# Patient Record
Sex: MALE | Race: BLACK OR AFRICAN AMERICAN | NOT HISPANIC OR LATINO | Employment: UNEMPLOYED
[De-identification: names, ages, dates, MRNs, and addresses within clinical notes are randomized per-mention and may not be internally consistent; named-entity substitution may affect disease eponyms.]

---
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
## 2023-07-19 NOTE — ED PROVIDER NOTES
EMERGENCY DEPARTMENT ENCOUNTER      NAME: Robin Cheney  AGE: 17 year old male  YOB: 2005  MRN: [id number]  EVALUATION DATE & TIME: 6/21/2022  1:01 AM    PCP: [facility]    ED PROVIDER: Prateek Simeon M.D.      Chief Complaint   Patient presents with     Pruritis     Knee Pain         FINAL IMPRESSION:  Left knee strain  Facial rash      ED COURSE & MEDICAL DECISION MAKING:    Pertinent Labs & Imaging studies reviewed. (See chart for details)  17 year old male presents to the Emergency Department for evaluation of refill itching and left knee pain left knee pain has been ongoing for the last few days.  Describes an achiness.  Looks along the midline of the tibial plateau.  Symptoms started after he started playing soccer.  Patient does have a history of Osgood Gustafson disease.  On exam he has marked tenderness along the tibial spine.  Tibial plateau nontender.  No effusions.  Will obtain x-ray.  Patient also reporting itching to his face.  Onset after wearing a new facemask last night while at work.  No obvious erythema or outbreak presently.  Likely heat related versus early contact type dermatitis.  We will plan on Allegra.  Patient cautioned avoid further exposures to similar mass.. Patient appears non toxic with stable vitals signs. Overall exam is benign.        1:07 AM I met with the patient for the initial interview and physical examination. Discussed plan for treatment and workup in the ED.      At the conclusion of the encounter I discussed the results of all of the tests and the disposition. The questions were answered and return precautions provided. The patient or family acknowledged understanding and was agreeable with the care plan.       PPE: Provider wore gloves, N95 mask, eye protection, surgical cap.     MEDICATIONS GIVEN IN THE EMERGENCY:  Medications - No data to display    NEW PRESCRIPTIONS STARTED AT TODAY'S ER VISIT  Current Discharge Medication List       START taking these medications    Details   fexofenadine (ALLEGRA) 60 MG tablet Take 1 tablet (60 mg) by mouth 2 times daily  Qty: 20 tablet, Refills: 0      ibuprofen (ADVIL/MOTRIN) 200 MG tablet Take 2 tablets (400 mg) by mouth every 8 hours as needed  Qty: 60 tablet, Refills: 0                =================================================================    HPI    Patient information was obtained from: Patient     Use of Intrepreter: N/A      Robin LISA Cheney is a 17 year old male with a pertient medical history of Osgood-Schlatter's disease who presents to the ED for evaluation of knee pain and pruritis. Patient presents with left knee pain with physical activity for the past 1 week. He reports playing soccer today with pain. He starts he has taken Benadryl for knee pain. Additionally, patient presents with facial itching after wearing a face covering at a new job for 8 hours. Denies taking any medication for itching. Patient denies any additional complaints at this time.       REVIEW OF SYSTEMS   Constitutional:  Denies fever, chills  Respiratory:  Denies productive cough or increased work of breathing  Cardiovascular:  Denies chest pain, palpitations  GI:  Denies abdominal pain, nausea, vomiting, or change in bowel or bladder habits   Musculoskeletal:  Endorses left knee pain.   Skin:  Endorses itching.    Neurologic:  Denies focal weakness  All systems negative except as marked.     PAST MEDICAL HISTORY:  Past Medical History:   Diagnosis Date     Nasal turbinate hypertrophy 11/27/2012     Near sighted 3/7/2016     Osgood-Schlatter's disease of both knees 4/1/2016     Vitamin D deficiency 4/5/2016       PAST SURGICAL HISTORY:  Past Surgical History:   Procedure Laterality Date     NO PAST SURGERIES           CURRENT MEDICATIONS:    No current facility-administered medications for this encounter.    Current Outpatient Medications:      cetirizine (ZYRTEC) 10 MG tablet, Take 1 tablet (10 mg) by mouth daily,  Disp: 30 tablet, Rfl: 0     diphenhydrAMINE (BENADRYL) 25 MG capsule, Take 1 capsule (25 mg) by mouth every 6 hours as needed for itching or allergies, Disp: 20 capsule, Rfl: 0     diphenhydrAMINE (BENADRYL) 25 MG capsule, Take 1 capsule (25 mg) by mouth every 6 hours as needed for itching or allergies, Disp: 30 capsule, Rfl: 0     molnupiravir (LAGEVRIO) 200 MG capsule, Take 4 capsules (800 mg) by mouth every 12 hours, Disp: 40 each, Rfl: 0     ondansetron (ZOFRAN) 4 MG tablet, [ONDANSETRON (ZOFRAN) 4 MG TABLET] Take 1 tablet (4 mg total) by mouth every 6 (six) hours., Disp: 12 tablet, Rfl: 0     predniSONE (DELTASONE) 20 MG tablet, Take two tablets (= 40mg) each day for 5 (five) days, Disp: 10 tablet, Rfl: 0     tiZANidine (ZANAFLEX) 4 MG tablet, [TIZANIDINE (ZANAFLEX) 4 MG TABLET] Take 1 tablet (4 mg total) by mouth every 6 (six) hours as needed., Disp: 20 tablet, Rfl: 0    ALLERGIES:  No Known Allergies    FAMILY HISTORY:  History reviewed. No pertinent family history.    SOCIAL HISTORY:   Social History     Socioeconomic History     Marital status: Single   Tobacco Use     Smoking status: Never Smoker     Smokeless tobacco: Never Used   Substance and Sexual Activity     Alcohol use: Never       VITALS:  Patient Vitals for the past 24 hrs:   BP Temp Temp src Pulse Resp SpO2 Weight   06/21/22 0059 116/69 98.4  F (36.9  C) Temporal 64 16 97 % 68.8 kg (151 lb 9.6 oz)        PHYSICAL EXAM    Constitutional:  Awake, alert, in no apparent distress  HENT:  Normocephalic, Atraumatic. Bilateral external ears normal. Oropharynx moist. Nose normal. Neck- Normal range of motion with no guarding, No midline cervical tenderness, Supple, No stridor.   Eyes:  PERRL, EOMI with no signs of entrapment, Conjunctiva normal, No discharge.   Musculoskeletal:   No edema. Good range of motion in all major joints. No major deformities noted. Mild soft tissue over medial left tibial plateau. No effusion.  No joint line tenderness..    Integument:  Warm, Dry, No erythema, No rash.   Neurologic:  Alert & oriented, Normal motor function, Normal sensory function, No focal deficits noted.   Psychiatric:  Affect normal, Judgment normal, Mood normal.       RADIOLOGY:  Reviewed all pertinent imaging. Please see official radiology report.  XR Knee Left 3 Views   Final Result   IMPRESSION: Normal joint spaces and alignment. No fracture or joint effusion.              I, Erin Lawson, am serving as a scribe to document services personally performed by Prateek Simeon MD, based on my observation and the provider's statements to me. I, Prateek Simeon MD attest that Erin Lawson is acting in a scribe capacity, has observed my performance of the services and has documented them in accordance with my direction.    Prateek Simeon M.D.  Emergency Medicine  [facility] EMERGENCY ROOM     Prateek Simeon MD  06/21/22 0154     1 pair

## 2024-02-16 ENCOUNTER — HOSPITAL ENCOUNTER (EMERGENCY)
Facility: CLINIC | Age: 19
Discharge: HOME | End: 2024-02-16
Attending: STUDENT IN AN ORGANIZED HEALTH CARE EDUCATION/TRAINING PROGRAM | Admitting: STUDENT IN AN ORGANIZED HEALTH CARE EDUCATION/TRAINING PROGRAM
Payer: COMMERCIAL

## 2024-02-16 ENCOUNTER — APPOINTMENT (OUTPATIENT)
Dept: GENERAL RADIOLOGY | Facility: CLINIC | Age: 19
End: 2024-02-16
Attending: EMERGENCY MEDICINE
Payer: COMMERCIAL

## 2024-02-16 VITALS
HEART RATE: 80 BPM | DIASTOLIC BLOOD PRESSURE: 74 MMHG | SYSTOLIC BLOOD PRESSURE: 126 MMHG | TEMPERATURE: 98.4 F | OXYGEN SATURATION: 99 % | RESPIRATION RATE: 18 BRPM

## 2024-02-16 DIAGNOSIS — S49.92XA INJURY OF LEFT ACROMIOCLAVICULAR JOINT, INITIAL ENCOUNTER: ICD-10-CM

## 2024-02-16 PROCEDURE — 250N000013 HC RX MED GY IP 250 OP 250 PS 637: Performed by: STUDENT IN AN ORGANIZED HEALTH CARE EDUCATION/TRAINING PROGRAM

## 2024-02-16 PROCEDURE — 99283 EMERGENCY DEPT VISIT LOW MDM: CPT

## 2024-02-16 PROCEDURE — 73030 X-RAY EXAM OF SHOULDER: CPT | Mod: LT

## 2024-02-16 RX ORDER — IBUPROFEN 600 MG/1
600 TABLET, FILM COATED ORAL EVERY 6 HOURS PRN
Qty: 90 TABLET | Refills: 0 | Status: SHIPPED | OUTPATIENT
Start: 2024-02-16 | End: 2024-02-16

## 2024-02-16 RX ORDER — IBUPROFEN 600 MG/1
600 TABLET, FILM COATED ORAL EVERY 6 HOURS PRN
Qty: 90 TABLET | Refills: 0 | Status: SHIPPED | OUTPATIENT
Start: 2024-02-16

## 2024-02-16 RX ORDER — IBUPROFEN 600 MG/1
600 TABLET, FILM COATED ORAL ONCE
Status: COMPLETED | OUTPATIENT
Start: 2024-02-16 | End: 2024-02-16

## 2024-02-16 RX ADMIN — IBUPROFEN 600 MG: 600 TABLET, FILM COATED ORAL at 18:44

## 2024-02-16 NOTE — ED TRIAGE NOTES
Pt arrives with c/o left shoulder dislocation. Pt reports he felt like it dislocated while at the gym today. CMS intact. Pt took tylenol PTA.     Triage Assessment (Adult)       Row Name 02/16/24 0573          Triage Assessment    Airway WDL WDL        Respiratory WDL    Respiratory WDL WDL        Cardiac WDL    Cardiac WDL WDL        Peripheral/Neurovascular WDL    Peripheral Neurovascular WDL WDL        Cognitive/Neuro/Behavioral WDL    Cognitive/Neuro/Behavioral WDL WDL

## 2024-02-17 NOTE — ED PROVIDER NOTES
History     Chief Complaint:  Shoulder Pain       The history is provided by the patient.      Robin Cheney is a 19 year old male who presents to the ED for shoulder pain. The patient reports a couple months ago he got into an accident that injured his left shoulder. He states he went to therapy 2 times with improvement. He adds he stopped going to therapy and began working a job at [employer] with lots of lifting. He reports at work a box fell onto his left shoulder and HR sent him here previously. He adds today he was doing dumbbell exercises with he heard a pop following with left shoulder pain. He adds he then began experiencing limited range of motion and nausea. He reports he took 3 tylenol but denies taking ibuprofen.     Independent Historian:   None - Patient Only    Review of External Notes:   None       Medications:    Cetirizine  Diphenhydramine  Fexofenadine  Ibuprofen  Molnupiravir  Ondansetron  Prednisone  Tizanidine    Past Medical History:    Nasal turbinate hypertrophy  Near sighted  Osgood-Schlatter's disease of both knees  Vitamin D deficiency  Patellar tendinitis of left knee  Pruritic disorder    Physical Exam   Patient Vitals for the past 24 hrs:   BP Temp Temp src Pulse Resp SpO2   02/16/24 1730 128/78 98.4  F (36.9  C) Oral 80 18 97 %        Physical Exam  General: Alert and cooperative with exam. Patient in no apparent distress. Normal mentation.  Head:  Scalp is NC/AT  Eyes:  No scleral icterus, PERRL  ENT:  The external nose and ears are normal.   Neck:  Normal range of motion without rigidity.  CV:  Regular rate and rhythm    No pathologic murmur   Resp:  Breath sounds are clear bilaterally    Non-labored, no retractions or accessory muscle use  GI:  Abdomen is soft, no distension, no tenderness. No peritoneal signs  MS:  Left AC joint tenderness to palpation. Limited range of motion to left shoulder due to pain. No obvious deformity. No humerus pain to palpation. Full ROM of left elbow  and wrist.   Skin:  Warm and dry, No rash or lesions noted.  Neuro:  Oriented x 3. No gross motor deficits.      Emergency Department Course   Imaging:  XR Shoulder Left G/E 3 Views   Final Result   IMPRESSION: Normal glenohumeral alignment. No displaced fracture is identified. There is some cortical irregularity to the distal clavicle with slight superior displacement of the distal clavicle at the acromioclavicular joint. Findings are suggestive of    acromioclavicular joint injury. The cortical irregularity along the distal clavicle is new compared to the 09/27/2023 study and may reflect underlying distal clavicular osteolysis.         Emergency Department Course & Assessments:           Interventions:  Medications   ibuprofen (ADVIL/MOTRIN) tablet 600 mg (600 mg Oral $Given 2/16/24 1844)        Independent Interpretation (X-rays, CTs, rhythm strip):  I independently reviewed X-Ray which shows no evidence of fracture or dislocation    Assessments/Consultations/Discussion of Management or Tests:  ED Course as of 02/16/24 1847 Fri Feb 16, 2024 1840 I obtained history and examined the patient as noted above.        Social Determinants of Health affecting care:   None    Disposition:  The patient was discharged.     Impression & Plan    Medical Decision Making:  oRbin Cheney is a 19 year old male who presents with left shoulder injury after lifting weights. On exam, there is no obvious deformity present. He is able to move left elbow and wrist without pain or difficulty. Left shoulder pain, most notable with palpation to left AC joint. Xray completed and does not show evidence of fracture or dislocation, however does show evidence of AC joint injury. Patient was placed in a sling and provided ibuprofen here. Recommend he wear sling for next two weeks and follow up with PT. Referral for PT provided. Encouraged continued use tylenol and ibuprofen along with ice. Discussed reasons to return to ER. Patient is safe  for discharge home.      Diagnosis:    ICD-10-CM    1. Injury of left acromioclavicular joint, initial encounter  S49.92XA ARM SLING L     Physical Therapy Referral           Discharge Medications:  New Prescriptions    IBUPROFEN (ADVIL/MOTRIN) 600 MG TABLET    Take 1 tablet (600 mg) by mouth every 6 hours as needed for mild pain, moderate pain or inflammatory pain          Scribe Disclosure:  DELIA, Shalini Piedra, am serving as a scribe at 6:39 PM on 2/16/2024 to document services personally performed by Kalee Christian PA-C based on my observations and the provider's statements to me.     2/16/2024   Kalee Covarrubias PA-C, PA-C  02/16/24 1901

## 2024-02-17 NOTE — DISCHARGE INSTRUCTIONS
Please take 600 mg ibuprofen every 6 hours for ongoing pain. Wear sling for next two weeks. PT referral provided, they will call to coordinate scheduling appointment.

## 2024-03-07 NOTE — ED PROVIDER NOTES
Emergency Department Encounter     Evaluation Date & Time:   2021 12:54 PM    CHIEF COMPLAINT:  Allergic Reaction      Triage Note:Pt presents to the ED with c/o worsening hives and difficulty swallowing that started around 1230 today. Pt unsure of what he is allergic to but noticed hives on his arms, feet, abdomen, and back. Denies difficulty breathing.         Impression and Plan     ED COURSE & MEDICAL DECISION MAKIN:05 PM I met with the patient to gather history and to perform my initial exam. We discussed plans for the ED course, including diagnostic testing and treatment. PPE worn: n95 mask, goggles.   1:56 PM I rechecked the patient. The hives are gone and he is ready for discharge.     Patient is here for evaluation of hives, no inciting factors.  No evidence of oral swelling, no wheezing, vomiting or diarrhea.  Following treatment with steroids, Benadryl, Pepcid patient have resolution of hives, states that he feels at his baseline.  Will discharge.  Recommend follow-up this primary.    At the conclusion of the encounter I discussed the results of all the tests and the disposition. The questions were answered. The patient or family acknowledged understanding and was agreeable with the care plan.    FINAL IMPRESSION:    ICD-10-CM    1. Urticaria  L50.9          Reassessments & Consults       MEDICATIONS GIVEN IN THE EMERGENCY DEPARTMENT:  Medications   0.9% sodium chloride BOLUS (1,000 mLs Intravenous New Bag 21 1327)     Followed by   sodium chloride 0.9% infusion (has no administration in time range)   methylPREDNISolone sodium succinate (solu-MEDROL) injection 125 mg (125 mg Intravenous Given 21 1330)   diphenhydrAMINE (BENADRYL) injection 50 mg (50 mg Intravenous Given 21 1328)   famotidine (PEPCID) injection 40 mg (40 mg Intravenous Given 21 1333)       NEW PRESCRIPTIONS STARTED AT TODAY'S ED VISIT:  New Prescriptions    No medications on file       HPI     HPI      Robin Cheney is a 16 year old male with a pertinent history of prior allergic reactions who presents to this ED via walk-in for evaluation of anaphylaxis.    Patient reports shortly after waking this morning (7/17) he broke out is full body hives which started on his palmar and plantar surfaces and progressed up his extremities. He states the rash is very itchy and he experienced palpitations during the initial wave of itchiness. Patient reports swallowing feels strange although he denies trouble breathing and shortness of breath. No new soaps, detergents, foods, or other new exposures. Patient states he was recently seen by an allergist who did labs and told him he is allergic to grass, dust, dust mites, cats, and dogs, although patient states he has frequent exposures to these allergens and has not had prior reactions. No other complaints or concerns expressed at this time.    REVIEW OF SYSTEMS:  Review of Systems   HENT: Positive for trouble swallowing.    Respiratory: Negative for cough, chest tightness, shortness of breath, wheezing and stridor.         Negative for trouble breathing.   Cardiovascular: Positive for palpitations. Negative for chest pain.   Gastrointestinal: Negative for abdominal pain, nausea and vomiting.   Genitourinary: Negative for dysuria, frequency and hematuria.   Musculoskeletal: Negative for myalgias.   Skin: Positive for rash (diffuse hives).        Positive for pruritus.   Neurological: Negative for dizziness, light-headedness and headaches.   All other systems reviewed and are negative.          Medical History     Past Medical History:   Diagnosis Date     Nasal turbinate hypertrophy 11/27/2012     Near sighted 3/7/2016     Osgood-Schlatter's disease of both knees 4/1/2016     Vitamin D deficiency 4/5/2016       Past Surgical History:   Procedure Laterality Date     NO PAST SURGERIES         No family history on file.    Social History     Tobacco Use     Smoking status:  Never Smoker     Smokeless tobacco: Never Used   Substance Use Topics     Alcohol use: Never     Drug use: Not on file       ondansetron (ZOFRAN) 4 MG tablet  tiZANidine (ZANAFLEX) 4 MG tablet        Physical Exam     First Vitals:  Patient Vitals for the past 24 hrs:   BP Temp Temp src Pulse Resp SpO2 Weight   07/17/21 1345 121/69 -- -- 78 -- 100 % --   07/17/21 1330 116/56 -- -- 85 -- 98 % --   07/17/21 1325 121/57 -- -- 64 -- 99 % --   07/17/21 1252 135/60 98.2  F (36.8  C) Oral 75 18 97 % 68 kg (150 lb)       PHYSICAL EXAM:   Physical Exam  Vitals and nursing note reviewed.   Constitutional:       General: He is not in acute distress.     Appearance: Normal appearance. He is not ill-appearing.   HENT:      Head: Normocephalic and atraumatic.      Comments: No swelling to lips, tongue, or posterior oropharynx.     Right Ear: External ear normal.      Left Ear: External ear normal.      Nose: Nose normal.      Mouth/Throat:      Mouth: Mucous membranes are moist.   Eyes:      Extraocular Movements: Extraocular movements intact.      Pupils: Pupils are equal, round, and reactive to light.   Cardiovascular:      Rate and Rhythm: Normal rate and regular rhythm.   Pulmonary:      Effort: Pulmonary effort is normal.      Breath sounds: Normal breath sounds. No stridor.   Abdominal:      General: Abdomen is flat. There is no distension.      Palpations: There is no mass.      Tenderness: There is no abdominal tenderness. There is no guarding or rebound.      Hernia: No hernia is present.   Musculoskeletal:         General: No swelling, tenderness or signs of injury. Normal range of motion.      Cervical back: Normal range of motion.   Skin:     General: Skin is warm.      Capillary Refill: Capillary refill takes less than 2 seconds.      Comments: Diffuse hives throughout body   Neurological:      General: No focal deficit present.      Mental Status: He is alert and oriented to person, place, and time. Mental status is  at baseline.      Cranial Nerves: No cranial nerve deficit.      Motor: No weakness.      Coordination: Coordination normal.   Psychiatric:         Mood and Affect: Mood normal.           Results     LAB:  All pertinent labs reviewed and interpreted  Labs Ordered and Resulted from Time of ED Arrival Up to the Time of Departure from the ED - No data to display    RADIOLOGY:  No orders to display              ECG:  None    PROCEDURES:  Procedures:  None    [facility] Documentation        CMS Diagnoses:           Cecilio Meade DO  Staff Physician  Emergency Medicine  [facility] EMERGENCY ROOM       Cecilio Meade DO  07/17/21 3955     No

## 2024-03-12 ENCOUNTER — HOSPITAL ENCOUNTER (EMERGENCY)
Facility: CLINIC | Age: 19
Discharge: HOME | End: 2024-03-12
Attending: EMERGENCY MEDICINE | Admitting: EMERGENCY MEDICINE
Payer: COMMERCIAL

## 2024-03-12 VITALS
DIASTOLIC BLOOD PRESSURE: 65 MMHG | HEART RATE: 61 BPM | OXYGEN SATURATION: 98 % | TEMPERATURE: 98.5 F | HEIGHT: 73 IN | BODY MASS INDEX: 19.88 KG/M2 | SYSTOLIC BLOOD PRESSURE: 111 MMHG | RESPIRATION RATE: 20 BRPM | WEIGHT: 150 LBS

## 2024-03-12 VITALS
TEMPERATURE: 99.5 F | SYSTOLIC BLOOD PRESSURE: 111 MMHG | OXYGEN SATURATION: 100 % | BODY MASS INDEX: 21.12 KG/M2 | RESPIRATION RATE: 15 BRPM | HEART RATE: 65 BPM | DIASTOLIC BLOOD PRESSURE: 64 MMHG | WEIGHT: 160.05 LBS

## 2024-03-12 DIAGNOSIS — T78.2XXA ANAPHYLAXIS, INITIAL ENCOUNTER: ICD-10-CM

## 2024-03-12 DIAGNOSIS — L50.9 URTICARIA: ICD-10-CM

## 2024-03-12 LAB
HOLD SPECIMEN: NORMAL

## 2024-03-12 PROCEDURE — 99285 EMERGENCY DEPT VISIT HI MDM: CPT | Mod: 25

## 2024-03-12 PROCEDURE — 96361 HYDRATE IV INFUSION ADD-ON: CPT

## 2024-03-12 PROCEDURE — 96375 TX/PRO/DX INJ NEW DRUG ADDON: CPT

## 2024-03-12 PROCEDURE — 250N000013 HC RX MED GY IP 250 OP 250 PS 637: Performed by: EMERGENCY MEDICINE

## 2024-03-12 PROCEDURE — 258N000003 HC RX IP 258 OP 636: Performed by: EMERGENCY MEDICINE

## 2024-03-12 PROCEDURE — 96374 THER/PROPH/DIAG INJ IV PUSH: CPT

## 2024-03-12 PROCEDURE — 250N000011 HC RX IP 250 OP 636

## 2024-03-12 PROCEDURE — 250N000009 HC RX 250: Performed by: EMERGENCY MEDICINE

## 2024-03-12 PROCEDURE — 250N000009 HC RX 250

## 2024-03-12 PROCEDURE — 250N000011 HC RX IP 250 OP 636: Performed by: EMERGENCY MEDICINE

## 2024-03-12 PROCEDURE — 258N000003 HC RX IP 258 OP 636

## 2024-03-12 RX ORDER — DIPHENHYDRAMINE HCL 25 MG
50 CAPSULE ORAL ONCE
Status: DISCONTINUED | OUTPATIENT
Start: 2024-03-12 | End: 2024-03-12

## 2024-03-12 RX ORDER — CETIRIZINE HYDROCHLORIDE 10 MG/1
10 TABLET ORAL ONCE
Status: COMPLETED | OUTPATIENT
Start: 2024-03-12 | End: 2024-03-12

## 2024-03-12 RX ORDER — DIPHENHYDRAMINE HYDROCHLORIDE 50 MG/ML
25 INJECTION INTRAMUSCULAR; INTRAVENOUS ONCE
Status: COMPLETED | OUTPATIENT
Start: 2024-03-12 | End: 2024-03-12

## 2024-03-12 RX ORDER — CETIRIZINE HYDROCHLORIDE 10 MG/1
10 TABLET ORAL DAILY
Qty: 5 TABLET | Refills: 0 | Status: SHIPPED | OUTPATIENT
Start: 2024-03-12 | End: 2024-03-17

## 2024-03-12 RX ORDER — PREDNISONE 20 MG/1
TABLET ORAL
Qty: 10 TABLET | Refills: 0 | Status: SHIPPED | OUTPATIENT
Start: 2024-03-13 | End: 2024-07-11

## 2024-03-12 RX ORDER — METHYLPREDNISOLONE SODIUM SUCCINATE 125 MG/2ML
125 INJECTION, POWDER, LYOPHILIZED, FOR SOLUTION INTRAMUSCULAR; INTRAVENOUS ONCE
Status: COMPLETED | OUTPATIENT
Start: 2024-03-12 | End: 2024-03-12

## 2024-03-12 RX ORDER — DIPHENHYDRAMINE HYDROCHLORIDE 50 MG/ML
50 INJECTION INTRAMUSCULAR; INTRAVENOUS ONCE
Status: COMPLETED | OUTPATIENT
Start: 2024-03-12 | End: 2024-03-12

## 2024-03-12 RX ORDER — LIDOCAINE 40 MG/G
CREAM TOPICAL
Status: DISCONTINUED | OUTPATIENT
Start: 2024-03-12 | End: 2024-03-13 | Stop reason: HOSPADM

## 2024-03-12 RX ADMIN — SODIUM CHLORIDE 1000 ML: 9 INJECTION, SOLUTION INTRAVENOUS at 12:11

## 2024-03-12 RX ADMIN — SODIUM CHLORIDE 1000 ML: 9 INJECTION, SOLUTION INTRAVENOUS at 21:01

## 2024-03-12 RX ADMIN — DIPHENHYDRAMINE HYDROCHLORIDE 25 MG: 50 INJECTION, SOLUTION INTRAMUSCULAR; INTRAVENOUS at 21:09

## 2024-03-12 RX ADMIN — EPINEPHRINE 0.5 MG: 1 INJECTION INTRAMUSCULAR; INTRAVENOUS; SUBCUTANEOUS at 20:51

## 2024-03-12 RX ADMIN — FAMOTIDINE 20 MG: 10 INJECTION, SOLUTION INTRAVENOUS at 21:00

## 2024-03-12 RX ADMIN — EPINEPHRINE 0.3 MG: 1 INJECTION INTRAMUSCULAR; INTRAVENOUS; SUBCUTANEOUS at 12:04

## 2024-03-12 RX ADMIN — DIPHENHYDRAMINE HYDROCHLORIDE 50 MG: 50 INJECTION INTRAMUSCULAR; INTRAVENOUS at 12:13

## 2024-03-12 RX ADMIN — METHYLPREDNISOLONE SODIUM SUCCINATE 125 MG: 125 INJECTION, POWDER, FOR SOLUTION INTRAMUSCULAR; INTRAVENOUS at 12:06

## 2024-03-12 RX ADMIN — CETIRIZINE HYDROCHLORIDE 10 MG: 10 TABLET, FILM COATED ORAL at 21:09

## 2024-03-12 RX ADMIN — FAMOTIDINE 20 MG: 10 INJECTION, SOLUTION INTRAVENOUS at 12:05

## 2024-03-12 ASSESSMENT — COLUMBIA-SUICIDE SEVERITY RATING SCALE - C-SSRS
2. HAVE YOU ACTUALLY HAD ANY THOUGHTS OF KILLING YOURSELF IN THE PAST MONTH?: NO
6. HAVE YOU EVER DONE ANYTHING, STARTED TO DO ANYTHING, OR PREPARED TO DO ANYTHING TO END YOUR LIFE?: NO
1. IN THE PAST MONTH, HAVE YOU WISHED YOU WERE DEAD OR WISHED YOU COULD GO TO SLEEP AND NOT WAKE UP?: NO
6. HAVE YOU EVER DONE ANYTHING, STARTED TO DO ANYTHING, OR PREPARED TO DO ANYTHING TO END YOUR LIFE?: NO
1. IN THE PAST MONTH, HAVE YOU WISHED YOU WERE DEAD OR WISHED YOU COULD GO TO SLEEP AND NOT WAKE UP?: NO
2. HAVE YOU ACTUALLY HAD ANY THOUGHTS OF KILLING YOURSELF IN THE PAST MONTH?: NO

## 2024-03-12 ASSESSMENT — ACTIVITIES OF DAILY LIVING (ADL)
ADLS_ACUITY_SCORE: 35
ADLS_ACUITY_SCORE: 33
ADLS_ACUITY_SCORE: 35

## 2024-03-12 NOTE — DISCHARGE INSTRUCTIONS
You can use Benadryl for itching.  He can also use Pepcid for itching.  Discharge Instructions  Allergic Reaction    An allergic reaction can result in a rash, itching, swelling, watery eyes, or a runny nose. A serious reaction can cause swelling of your mouth or throat, or difficulty breathing (wheezing). The most serious allergy is called anaphylaxis, and can be life-threatening. Many allergies result in hives, also called urticaria.       An allergy happens when the body s natural defense system (immune system) overreacts to something. The thing that triggers your allergic reaction is called an allergen. The first time you are exposed to your allergen, you may not have any reaction, but the body makes a protein called an antibody. The antibody lets the body recognize and remember the allergen.  Every time you are exposed to your allergen you get more antibody and your reaction can be more severe.      Generally, every Emergency Department visit should have a follow-up clinic visit with either a primary or a specialty clinic/provider. Please follow-up as instructed by your emergency provider today.    Call 911 if you have:  Swelling of the lips, tongue or throat.  Hoarse voice, drooling or trouble breathing.  Chest pain or shortness of breath.  Fainting or unconsciousness.    What can I do to help myself?  If you know what caused your allergy, do not touch it, throw any of it away, and tell others not to have it around you. Wear a medical alert bracelet with a name of your allergen on it.  If you do not know what you are allergic to, keep a journal of everything that you are exposed to (foods, soaps, medicines, etc.). Take this with you when you follow up with your primary provider or specialist (Allergist). This may help determine what is causing the allergic reaction.  Take any medicines that are prescribed.  Antihistamines can decrease rash or itching. You may use Benadryl  (diphenhydramine) for rash or itching  according to package directions, or use a prescription antihistamine as recommended by your provider.  For significant allergic reactions, you may have been given a prescription for an epinephrine (adrenaline) auto injector. Carry this with you at all times! Use it if you are having any symptoms of anaphylaxis.  Do not be afraid to use it. Return to the Emergency Department if you use your auto injector, call 911 if it does not resolve the symptoms. It is only meant to buy time until you can get to the Emergency Department!  If you were given a prescription for medicine here today, be sure to read all of the information (including the package insert) that comes with your prescription.  This will include important information about the medicine, its side effects, and any warnings that you need to know about.  The pharmacist who fills the prescription can provide more information and answer questions you may have about the medicine.  If you have questions or concerns that the pharmacist cannot address, please call or return to the Emergency Department.   Remember that you can always come back to the Emergency Department if you are not able to see your regular provider in the amount of time listed above, if you get any new symptoms, or if there is anything that worries you.

## 2024-03-12 NOTE — ED PROVIDER NOTES
"  History     Chief Complaint:  Allergic Reaction       HPI   Robin Cheney is a 19 year old male with history of multiple environmental allergies who presents with complaint of difficulty swallowing and hives.  Patient states he started to notice hives last night around 10 PM.  Hives are worsening today, have spread over his torso and all 4 extremities, very pruritic.  States he has not taken any medication for this as he does not want to break his fast for Ramadan.  He does have an EpiPen however he has not used that.  He has tightness in his throat and difficulty swallowing for the last hour.  He denies headache, dizziness, chest pain, shortness of breath, abdominal pain, nausea and vomiting.  He has never been intubated however he has had to use his EpiPen at least 5 or 6 times in the past.      Independent Historian:   None - Patient Only    Review of External Notes:   1/26/2022 ED visit for allergic reaction      Medications:    cetirizine (ZYRTEC) 10 MG tablet  diphenhydrAMINE (BENADRYL) 25 MG capsule  diphenhydrAMINE (BENADRYL) 25 MG capsule  fexofenadine (ALLEGRA) 60 MG tablet  ibuprofen (ADVIL/MOTRIN) 200 MG tablet  ibuprofen (ADVIL/MOTRIN) 600 MG tablet  molnupiravir (LAGEVRIO) 200 MG capsule  ondansetron (ZOFRAN) 4 MG tablet  predniSONE (DELTASONE) 20 MG tablet  tiZANidine (ZANAFLEX) 4 MG tablet        Past Medical History:    Past Medical History:   Diagnosis Date    Nasal turbinate hypertrophy 11/27/2012    Near sighted 3/7/2016    Osgood-Schlatter's disease of both knees 4/1/2016    Vitamin D deficiency 4/5/2016       Past Surgical History:    Past Surgical History:   Procedure Laterality Date    NO PAST SURGERIES          Physical Exam   Patient Vitals for the past 24 hrs:   BP Temp Temp src Pulse Resp SpO2 Height Weight   03/12/24 1149 (!) 124/102 98.5  F (36.9  C) Oral 103 20 100 % 1.854 m (6' 1\") 68 kg (150 lb)        Physical Exam  Physical Exam:  GENERAL: Warm, dry, alert, no increased work of " breathing, appears mildly uncomfortable while sitting in wheelchair.  HEENT: No scleral icterus, PERRL, clear conjunctiva without injection.  No erythema nor edema noted on lips, tongue, and posterior oropharynx.  NECK: supple without lymphadenopathy.  No stiffness or restricted range of motion  HEART: Regular rate and rhythm, no murmur or rubs  LUNGS: CTAB, moving air well.  No crackles or wheezes are heard.  Speaking in full sentences, no respiratory distress.  ABD: Soft, nontender, nondistended, no guarding, with good bowel sounds heard.  BACK: no obvious deformities  EXTREMITIES: Moves all extremities without difficulty, no calf tenderness or peripheral edema.  SKIN: Wheals of erythematous to pink swellings of varying shapes and sizes with central pallor across the chest, abdomen, all 4 extremities..  NEUROLOGICAL: No focal deficit, alert and oriented x 4.    PSYCH: Appropriate mood and affect.     Emergency Department Course   ECG      Imaging:  No orders to display          Laboratory:  Labs Ordered and Resulted from Time of ED Arrival to Time of ED Departure - No data to display     Procedures   None      Emergency Department Course & Assessments:    Interventions:  Medications   sodium chloride (PF) 0.9% PF flush 3 mL (has no administration in time range)   sodium chloride (PF) 0.9% PF flush 3 mL (has no administration in time range)   diphenhydrAMINE (BENADRYL) injection 50 mg (has no administration in time range)   sodium chloride 0.9% BOLUS 1,000 mL (has no administration in time range)   famotidine (PEPCID) injection 20 mg (20 mg Intravenous $Given 3/12/24 1205)   methylPREDNISolone sodium succinate (solu-MEDROL) injection 125 mg (125 mg Intravenous $Given 3/12/24 1206)   EPINEPHrine (ADRENALIN) kit 0.3-0.5 mg (0.3 mg Intramuscular $Given 3/12/24 1204)        Assessments:  See MDM    Independent Interpretation (X-rays, CTs, rhythm strip):  None    Consultations/Discussion of Management or Tests:  Staffed  with Dr. Ochoa       Social Determinants of Health affecting care:   None    Disposition:  The patient was discharged.     Impression & Plan    CMS Diagnoses: None          Medical Decision Making:  Patient is a 19-year-old male with history of environmental allergies who presents with urticarial rash across his torso and extremities and difficulty swallowing.  Differential includes but is not limited to anaphylaxis, urticaria, viral rash among many others.  Vital signs are unremarkable presentation, no hypoxia.  Given prior history of anaphylaxis requiring epinephrine and clinical presentation with hives and difficulty swallowing elected for immediate IM epinephrine.  Patient was also provided further allergy medications including steroid and Benadryl.  Within 20 minutes patient reports he no longer has difficulty swallowing, no tightness in throat, and he no longer has pruritus.  Hives across his body have dissipated.  We watched the patient for 2 hours after giving him his epinephrine, there was no return of symptoms, no further complaints.  Patient still has his EpiPen available, he was told not to hesitate to use this next time.  We discussed other symptoms for which she should return to the emergency department.  Patient states he understands and agrees with these plans.  The patient was discharged.      Diagnosis:  No diagnosis found.     Discharge Medications:  New Prescriptions    No medications on file          Cecilio Vásquez PA-C  3/12/2024   Jessica Ochoa MD Speakman, John, PA-C  03/12/24 [id number]       Cecilio Vásquez PA-C  03/12/24 [id number]

## 2024-03-12 NOTE — ED TRIAGE NOTES
Pt presents for evaluation of an allergic reaction. Pt started with hives last night around 2200 after getting home from prayer. Started out with a few hives on the arms and forehead. Pt then went to bed, woke up and hives were gone. Then today, around 1040, hives came back and were generalized. Pt also c/o difficulty breathing and difficulty swallowing. Has an Epi-pen for allergies related to dust mites, grass, cockroaches, cats and dogs has not used any meds PTA.

## 2024-03-12 NOTE — ED PROVIDER NOTES
ED ATTENDING PHYSICIAN NOTE:   I evaluated this patient in conjunction with Cecilio Vásquez PA-C  I have participated in the care of the patient and personally performed key elements of the history, exam, and medical decision making.      HPI:   Robin Cheney is a 19 year old male with a history of anaphylaxis to environmental allergies presents to the emergency department with a complaint of hives, itching, throat swelling.  Patient states that he does have an EpiPen but has not used it.  His symptoms started last night and worsened this morning.  He has not had any vomiting or diarrhea.    Independent Historian:   None - Patient Only    Review of External Notes:       EXAM:   General: Well-nourished, resting comfortably when I enter the room  Eyes: Pupils equal, conjunctivae pink no scleral icterus or conjunctival injection  ENT:  Moist mucus membranes.  Uvula is midline, no swelling of the oropharynx.  Respiratory:  Lungs clear to auscultation bilaterally, no crackles/rubs/wheezes.  Good air movement  CV: Normal rate and rhythm, no murmurs  GI:  Abdomen soft and non-distended.  No tenderness, guarding or rebound  Skin: Warm, dry.  Urticarial rash diffusely.  Musculoskeletal: No peripheral edema or calf tenderness  Neuro: Alert and oriented to person/place/time  Psychiatric: Normal affect      Independent Interpretation (X-rays, CTs, rhythm strip):  None    Consultations/Discussion of Management or Tests:  None     Social Determinants of Health affecting care:   None     MEDICAL DECISION MAKING/ASSESSMENT AND PLAN:   19-year-old male with a history of anaphylaxis presents to the emergency department with a complaint of allergic reaction.  Patient reports that his symptoms started yesterday with a rash and itching.  He states that he has a lot of environmental allergies but has not been to the allergist.  Today he noticed some itching and possible swelling of his throat.  On exam patient is well-appearing, he is  speaking in full sentences, he is protecting his airway.  No swelling of the tongue or oropharynx.  He does have a urticarial rash diffusely all over his body.  It is very itchy.  He has not had any vomiting or diarrhea.  Patient's vital signs have been stable here in the ED, he is not hypoxic.  Patient feels much better after the epinephrine.  He is also given Solu-Medrol, Benadryl, and Pepcid.  Patient does have an EpiPen, he just did not use it today.  Does not need a refill prescription.  Patient is observed here in the emergency department, and does not have any return of symptoms.  He feels much better.  Patient is discharged home with return precautions.     DIAGNOSIS:     ICD-10-CM    1. Anaphylaxis, initial encounter  T78.2XXA                DISPOSITION:   Patient is discharged home.     Jessica Ochoa MD  3/12/2024  [facility] EMERGENCY DEPT      Jessica Ochoa MD  03/12/24 1558

## 2024-03-13 NOTE — DISCHARGE INSTRUCTIONS
What do you do next:   Continue your home medications unless we have specifically changed them  If medications were prescribed today, take these as directed  Follow up as indicated below    When do you return: Review your discharge papers for specifics on reasons to return.    Thank you for allowing us to care for you today.

## 2024-03-13 NOTE — ED PROVIDER NOTES
History     Chief Complaint:  Allergic Reaction       HPI   Robin Cheney is a 19 year old male who presents for evaluation of allergic reaction. During evaluation, the patient had hives on his chest, abdomen, and arms with swollen lips which were noticeable to the patient. He reports not feeling shortness of breath. The patient was seen in the ER earlier today for an allergic reaction where an epi pen was administered. He was discharged home after hives and oral swelling resolved. At home hives and swelling returned along with shortness of breath. Denies exposure to new soaps or laundry detergents.        Independent Historian:    None - Patient Only    Review of External Notes:  ED provider note from earlier today: The patient received Benadryl, Pepcid, Solu-Medrol, and intramuscular epinephrine.  Per the medical decision making, the patient still has his epinephrine pen available.    Allergies:  Cats  Cockroach  Dogs  Dust Mites  Grass     Physical Exam   Patient Vitals for the past 24 hrs:   BP Temp Pulse Resp SpO2 Weight   03/12/24 2200 118/65 -- 90 15 98 % --   03/12/24 2145 120/67 -- 94 14 99 % --   03/12/24 2130 116/66 -- 99 14 100 % --   03/12/24 2115 120/61 -- 101 12 97 % --   03/12/24 2100 130/74 -- 110 10 98 % --   03/12/24 2049 135/72 -- 104 15 100 % --   03/12/24 2042 119/89 -- -- -- -- --   03/12/24 2040 -- 99.5  F (37.5  C) (!) 139 20 100 % 72.6 kg (160 lb 0.9 oz)        Physical Exam  Constitutional: Vital signs reviewed as above.   Eyes: PEERL, EOMI B/L  Oropharynx: MMM.  There appears to be swelling/hives on the upper and lower lips.  There do not appear to be any intraoral lesions, tongue swelling, or swelling on the floor of the mouth.  Uvula is midline and is not edematous.  Neck:  No JVD noted. FROM.  No stridor noted.  Cardiovascular: tachycardic rate, Regular rhythm and normal heart sounds.  No murmur heard. Equal B/L peripheral pulses.  Pulmonary/Chest: Effort normal and breath sounds  normal. No respiratory distress. Patient has no wheezes. Patient has no rales.   Gastrointestinal: Soft. There is no tenderness.   Musculoskeletal/Extremities: No pitting edema noted. Normal tone.  Neurological: Alert  Skin: Skin is warm and dry. There is no diaphoresis noted.   Psychiatric: The patient appears calm.     Emergency Department Course       Emergency Department Course & Assessments:    Interventions:  Medications   lidocaine 1 % 0.1-1 mL (has no administration in time range)   lidocaine (LMX4) cream (has no administration in time range)   sodium chloride (PF) 0.9% PF flush 3 mL (3 mLs Intracatheter Not Given 3/12/24 2101)   sodium chloride (PF) 0.9% PF flush 3 mL (has no administration in time range)   famotidine (PEPCID) injection 20 mg (20 mg Intravenous $Given 3/12/24 2100)   EPINEPHrine (ADRENALIN) kit 0.5 mg (0.5 mg Intramuscular $Given 3/12/24 2051)   sodium chloride 0.9% BOLUS 1,000 mL (0 mLs Intravenous Stopped 3/12/24 2209)   cetirizine (zyrTEC) tablet 10 mg (10 mg Oral $Given 3/12/24 2109)   diphenhydrAMINE (BENADRYL) injection 25 mg (25 mg Intravenous $Given 3/12/24 2109)        Assessments, Independent Interpretation, Consult/Discussion of ManagementTests:  ED Course as of 03/12/24 2210   Tue Mar 12, 2024   2045 Initial evaluation    2201 Rechecked and updated.       Social Determinants of Health affecting care:  None    Disposition:  See ED Course and [facility]    Impression & Plan    CMS Diagnoses: None    Code Status: No Order    MIPS (If applicable):  N/A    Medical Decision Making:  This 19-year-old male patient presents to the ED due to urticaria.  Please see the HPI and exam for specifics.  The patient did not have shortness of breath or throat tingling/tightness.  I retreated him with epinephrine as well as Benadryl.  He received Zyrtec as well.  On reassessment, the patient has almost complete resolution of symptoms.  He wishes to go home.  I will encourage close outpatient follow-up,  prescribed him Zyrtec as well as steroids and encouraged return with new or worsening symptoms.      Critical Care:  None.    Diagnosis:    ICD-10-CM    1. Urticaria  L50.9            Discharge Medications:  New Prescriptions    CETIRIZINE (ZYRTEC) 10 MG TABLET    Take 1 tablet (10 mg) by mouth daily for 5 days    PREDNISONE (DELTASONE) 20 MG TABLET    Take two tablets (= 40mg) each day for 5 (five) days        Scribe Disclosure:  Cathy LIN, am serving as a scribe at 8:44 PM on 3/12/2024 to document services personally performed by Keith Blackwood DO based on my observations and the provider's statements to me.    3/12/2024   Keith Blackwood DO Burns, Bradley Joseph, DO  03/12/24 4533

## 2024-03-13 NOTE — ED TRIAGE NOTES
Patient seen earlier today in ER for allergic reaction. Gave epi pen earlier. Discharged home, swelling completely resolved. At home, swelling return. Hives and oral swelling present. Patient reports difficulty breathing due to swelling. Patient denies any new exposures.

## 2024-07-11 ENCOUNTER — HOSPITAL ENCOUNTER (EMERGENCY)
Facility: CLINIC | Age: 19
Discharge: HOME | End: 2024-07-11
Attending: STUDENT IN AN ORGANIZED HEALTH CARE EDUCATION/TRAINING PROGRAM | Admitting: STUDENT IN AN ORGANIZED HEALTH CARE EDUCATION/TRAINING PROGRAM
Payer: COMMERCIAL

## 2024-07-11 VITALS
HEIGHT: 72 IN | SYSTOLIC BLOOD PRESSURE: 115 MMHG | DIASTOLIC BLOOD PRESSURE: 54 MMHG | HEART RATE: 60 BPM | WEIGHT: 151.4 LBS | BODY MASS INDEX: 20.51 KG/M2 | TEMPERATURE: 99.3 F | RESPIRATION RATE: 20 BRPM | OXYGEN SATURATION: 99 %

## 2024-07-11 DIAGNOSIS — T78.40XA ALLERGIC REACTION, INITIAL ENCOUNTER: ICD-10-CM

## 2024-07-11 PROCEDURE — 99284 EMERGENCY DEPT VISIT MOD MDM: CPT

## 2024-07-11 PROCEDURE — 250N000012 HC RX MED GY IP 250 OP 636 PS 637: Performed by: STUDENT IN AN ORGANIZED HEALTH CARE EDUCATION/TRAINING PROGRAM

## 2024-07-11 PROCEDURE — 250N000013 HC RX MED GY IP 250 OP 250 PS 637: Performed by: STUDENT IN AN ORGANIZED HEALTH CARE EDUCATION/TRAINING PROGRAM

## 2024-07-11 RX ORDER — DIPHENHYDRAMINE HCL 25 MG
25 TABLET ORAL EVERY 6 HOURS PRN
Qty: 20 TABLET | Refills: 0 | Status: SHIPPED | OUTPATIENT
Start: 2024-07-11

## 2024-07-11 RX ORDER — PREDNISONE 20 MG/1
TABLET ORAL
Qty: 8 TABLET | Refills: 0 | Status: SHIPPED | OUTPATIENT
Start: 2024-07-11

## 2024-07-11 RX ORDER — EPINEPHRINE 0.3 MG/.3ML
0.3 INJECTION SUBCUTANEOUS
Qty: 2 EACH | Refills: 0 | Status: SHIPPED | OUTPATIENT
Start: 2024-07-11

## 2024-07-11 RX ORDER — PREDNISONE 20 MG/1
40 TABLET ORAL ONCE
Status: COMPLETED | OUTPATIENT
Start: 2024-07-11 | End: 2024-07-11

## 2024-07-11 RX ORDER — DIPHENHYDRAMINE HCL 25 MG
50 CAPSULE ORAL ONCE
Status: COMPLETED | OUTPATIENT
Start: 2024-07-11 | End: 2024-07-11

## 2024-07-11 RX ORDER — FAMOTIDINE 20 MG/1
20 TABLET, FILM COATED ORAL ONCE
Status: COMPLETED | OUTPATIENT
Start: 2024-07-11 | End: 2024-07-11

## 2024-07-11 RX ADMIN — PREDNISONE 40 MG: 20 TABLET ORAL at 17:14

## 2024-07-11 RX ADMIN — FAMOTIDINE 20 MG: 20 TABLET ORAL at 17:14

## 2024-07-11 RX ADMIN — DIPHENHYDRAMINE HYDROCHLORIDE 50 MG: 25 CAPSULE ORAL at 17:14

## 2024-07-11 ASSESSMENT — ACTIVITIES OF DAILY LIVING (ADL)
ADLS_ACUITY_SCORE: 35

## 2024-07-11 ASSESSMENT — COLUMBIA-SUICIDE SEVERITY RATING SCALE - C-SSRS
1. IN THE PAST MONTH, HAVE YOU WISHED YOU WERE DEAD OR WISHED YOU COULD GO TO SLEEP AND NOT WAKE UP?: NO
6. HAVE YOU EVER DONE ANYTHING, STARTED TO DO ANYTHING, OR PREPARED TO DO ANYTHING TO END YOUR LIFE?: NO
2. HAVE YOU ACTUALLY HAD ANY THOUGHTS OF KILLING YOURSELF IN THE PAST MONTH?: NO

## 2024-07-11 NOTE — ED PROVIDER NOTES
Emergency Department Note      History of Present Illness     Chief Complaint   Allergic Reaction      HPI   Robin Cheney is a 19 year old male with history of pruritic disorder who presents to the ED for evaluation of an allergic reaction. Patient presents with an allergic reaction to an unknown allergen. He developed hives on his face around 0900 which worsened to full body hives a few hours prior to arrival at the ED. He noticed tongue swelling and shortness of breath. On his way to the ED, he developed blurry vision in his left eye and gave himself an Epipen injection about 15 minutes prior to arrival. Respiratory symptoms and left eye blurriness are better at the time of evaluation. Endorses lip swelling. No headache, nausea, vomiting, history of asthma, insect bites/stings, new soaps or detergents, new clothing, or new exposures. He is not short of breath currently. Robin has used an Epipen multiple times in the past but has never been admitted for anaphylaxis.        Independent Historian   None    Review of External Notes   none    Past Medical History     Medical History and Problem List   Nasal turbinate hypertrophy  Osgood-Schlatter's disease   Vitamin D deficiency  Pruritic disorder     Medications   Epinephrine     Physical Exam     Patient Vitals for the past 24 hrs:   BP Temp Temp src Pulse Resp SpO2 Height Weight   07/11/24 1929 115/54 -- -- 68 -- 99 % -- --   07/11/24 1916 -- -- -- 55 -- 98 % -- --   07/11/24 1901 -- -- -- 51 -- 99 % -- --   07/11/24 1846 -- -- -- 59 -- 98 % -- --   07/11/24 1831 -- -- -- 60 -- 99 % -- --   07/11/24 1826 -- -- -- 85 -- 100 % -- --   07/11/24 1816 -- -- -- 56 -- 100 % -- --   07/11/24 1811 -- -- -- 67 -- 100 % -- --   07/11/24 1756 -- -- -- 63 -- 99 % -- --   07/11/24 1741 -- -- -- 69 -- 100 % -- --   07/11/24 1731 -- -- -- 67 -- 99 % -- --   07/11/24 1726 -- -- -- 80 -- 100 % -- --   07/11/24 1716 -- -- -- 92 -- 99 % -- --   07/11/24 1711 -- -- -- 80 -- 100 %  -- --   07/11/24 1701 -- -- -- -- -- 95 % -- --   07/11/24 1657 -- 99.3  F (37.4  C) Temporal -- -- -- -- --   07/11/24 1651 117/74 -- -- 108 20 99 % 1.829 m (6') 68.7 kg (151 lb 6.4 oz)     Physical Exam    GENERAL: Patient well-appearing. No lip swelling or tongue swelling.   HEAD: Atraumatic.  NECK: No rigidity  CV: RRR, no murmurs, rubs or gallops  PULM: CTAB with good aeration; no retractions, rales, rhonchi, or wheezing  ABD: Soft, nontender, nondistended, no guarding  DERM: Skin warm and dry. Blanching hives scattered throughout body.   EXTREMITY: Moving all extremities without difficulty. No calf tenderness or peripheral edema  VASCULAR: Symmetric pulses bilaterally   NEURO: Alert.  Answering questions appropriately.  CN 2-12 fully intact. Strength 5/5 bilateral LE/UE. Sensation fully intact to light touch symmetrically bilateral LE/UE. Normal finger-to-nose and heel to shin. No ataxia.    Diagnostics     Lab Results   Labs Ordered and Resulted from Time of ED Arrival to Time of ED Departure - No data to display    Imaging   No orders to display       Independent Interpretation   None    ED Course      Medications Administered   Medications   diphenhydrAMINE (BENADRYL) capsule 50 mg (50 mg Oral $Given 7/11/24 1714)   famotidine (PEPCID) tablet 20 mg (20 mg Oral $Given 7/11/24 1714)   predniSONE (DELTASONE) tablet 40 mg (40 mg Oral $Given 7/11/24 1714)       Procedures   Procedures     Discussion of Management   None    ED Course   ED Course as of 07/11/24 2028   Thu Jul 11, 2024 1659 I obtained history and examined the patient as noted above.    2027 I rechecked the patient and explained findings.        Optional/Additional Documentation  None    Medical Decision Making / Diagnosis     CMS Diagnoses: None    MIPS       None    [facility]   Robinshweta Cheney is a 19 year old male     Symptoms started for allergic reaction.    Chronic condition complicating -multiple biologic reactions of undetermined  etiology.    DDx sepsis, PE however evaluation not consistent with these etiologies.  Patient already taken epi before arrival so he was monitored and he did well.    Given steroid, Benadryl, famotidine/    Patient states he does well with prednisone at home as he has had biphasic reactions in the past.    On repeat assessment patient is improved and was monitored for multiple hours.      Given prescription for EpiPen, Benadryl, and prednisone.    In regards to transient blurred vision.  Unremarkable neurologic exam.  Do not think he requires advanced imaging.  No current vision changes.    I have evaluated the patient for acute medical emergencies and have clinically decided no further acute medical interventions are required. Reassessed multiple times and well appearing. Patient stable for discharge. All questions answered. Given strict return precautions. Patient content with plan. The differential diagnosis and treatment modalities were discussed thoroughly with the patient. Recommended PCP follow-up in 2-3 days.        Disposition   The patient was discharged.     Diagnosis     ICD-10-CM    1. Allergic reaction, initial encounter  T78.40XA            Discharge Medications   New Prescriptions    DIPHENHYDRAMINE (BENADRYL) 25 MG TABLET    Take 1 tablet (25 mg) by mouth every 6 hours as needed for itching or allergies    EPINEPHRINE (ANY BX GENERIC EQUIV) 0.3 MG/0.3ML INJECTION 2-PACK    Inject 0.3 mLs (0.3 mg) into the muscle once as needed for anaphylaxis May repeat one time in 5-15 minutes if response to initial dose is inadequate.    PREDNISONE (DELTASONE) 20 MG TABLET    Take two tablets (= 40mg) each day for 5 (five) days         Scribe Disclosure:  I, Shani Gustafson, am serving as a scribe at 5:07 PM on 7/11/2024 to document services personally performed by Carmelo Lawson MD based on my observations and the provider's statements to me.        Carmelo Lawson MD  07/11/24 2029

## 2024-07-11 NOTE — ED TRIAGE NOTES
Pt arrives by self for allergic reaction to unknown allergen. Pt noticed hives on face beginning around 9:00am. They progressed throughout the day and began to affect his breathing and tongue swelling. Pt gave himself epi injection while driving here. Respiratory Sx have improved, hives still present. Tachy, all other VSS

## 2024-07-12 ENCOUNTER — HOSPITAL ENCOUNTER (EMERGENCY)
Facility: CLINIC | Age: 19
Discharge: LEFT BEFORE DISCHARGE | End: 2024-07-12
Admitting: EMERGENCY MEDICINE
Payer: COMMERCIAL

## 2024-07-12 VITALS
DIASTOLIC BLOOD PRESSURE: 83 MMHG | TEMPERATURE: 99 F | WEIGHT: 151.4 LBS | SYSTOLIC BLOOD PRESSURE: 130 MMHG | HEIGHT: 72 IN | OXYGEN SATURATION: 99 % | BODY MASS INDEX: 20.51 KG/M2 | HEART RATE: 86 BPM | RESPIRATION RATE: 20 BRPM

## 2024-07-12 PROCEDURE — 250N000013 HC RX MED GY IP 250 OP 250 PS 637: Performed by: EMERGENCY MEDICINE

## 2024-07-12 PROCEDURE — 99281 EMR DPT VST MAYX REQ PHY/QHP: CPT

## 2024-07-12 RX ORDER — DIPHENHYDRAMINE HYDROCHLORIDE 50 MG/ML
50 INJECTION INTRAMUSCULAR; INTRAVENOUS ONCE
Status: COMPLETED | OUTPATIENT
Start: 2024-07-12 | End: 2024-07-12

## 2024-07-12 RX ORDER — DIPHENHYDRAMINE HCL 25 MG
50 CAPSULE ORAL ONCE
Status: COMPLETED | OUTPATIENT
Start: 2024-07-12 | End: 2024-07-12

## 2024-07-12 RX ADMIN — DIPHENHYDRAMINE HYDROCHLORIDE 50 MG: 25 CAPSULE ORAL at 07:21

## 2024-07-12 ASSESSMENT — COLUMBIA-SUICIDE SEVERITY RATING SCALE - C-SSRS
6. HAVE YOU EVER DONE ANYTHING, STARTED TO DO ANYTHING, OR PREPARED TO DO ANYTHING TO END YOUR LIFE?: NO
2. HAVE YOU ACTUALLY HAD ANY THOUGHTS OF KILLING YOURSELF IN THE PAST MONTH?: NO
1. IN THE PAST MONTH, HAVE YOU WISHED YOU WERE DEAD OR WISHED YOU COULD GO TO SLEEP AND NOT WAKE UP?: NO

## 2024-07-12 NOTE — ED NOTES
"Pt resting in bed. Reports feeling \"sleepy\". No oral swelling or SOB. Itchiness improved. VSS. ABC intact.   "

## 2024-07-25 ENCOUNTER — APPOINTMENT (OUTPATIENT)
Dept: GENERAL RADIOLOGY | Facility: CLINIC | Age: 19
End: 2024-07-25
Attending: EMERGENCY MEDICINE

## 2024-07-25 ENCOUNTER — HOSPITAL ENCOUNTER (EMERGENCY)
Facility: CLINIC | Age: 19
Discharge: HOME | End: 2024-07-25
Attending: EMERGENCY MEDICINE | Admitting: EMERGENCY MEDICINE

## 2024-07-25 ENCOUNTER — APPOINTMENT (OUTPATIENT)
Dept: CT IMAGING | Facility: CLINIC | Age: 19
End: 2024-07-25
Attending: EMERGENCY MEDICINE

## 2024-07-25 VITALS
BODY MASS INDEX: 20.86 KG/M2 | OXYGEN SATURATION: 98 % | DIASTOLIC BLOOD PRESSURE: 73 MMHG | TEMPERATURE: 98.3 F | RESPIRATION RATE: 16 BRPM | WEIGHT: 154 LBS | SYSTOLIC BLOOD PRESSURE: 116 MMHG | HEART RATE: 108 BPM | HEIGHT: 72 IN
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
## 2024-07-25 LAB
ABO/RH(D): NORMAL
ALBUMIN SERPL BCG-MCNC: 4.3 G/DL (ref 3.5–5.2)
ALP SERPL-CCNC: 84 U/L (ref 40–150)
ALT SERPL W P-5'-P-CCNC: 43 U/L (ref 0–70)
ANION GAP SERPL CALCULATED.3IONS-SCNC: 13 MMOL/L (ref 7–15)
ANTIBODY SCREEN: NEGATIVE
AST SERPL W P-5'-P-CCNC: 31 U/L (ref 0–45)
BASOPHILS # BLD AUTO: 0 10E3/UL (ref 0–0.2)
BASOPHILS NFR BLD AUTO: 0 %
BILIRUB SERPL-MCNC: 0.7 MG/DL
BUN SERPL-MCNC: 7.5 MG/DL (ref 6–20)
CALCIUM SERPL-MCNC: 8.7 MG/DL (ref 8.8–10.4)
CHLORIDE SERPL-SCNC: 100 MMOL/L (ref 98–107)
CREAT SERPL-MCNC: 0.85 MG/DL (ref 0.67–1.17)
EGFRCR SERPLBLD CKD-EPI 2021: >90 ML/MIN/1.73M2
EOSINOPHIL # BLD AUTO: 0 10E3/UL (ref 0–0.7)
EOSINOPHIL NFR BLD AUTO: 0 %
ERYTHROCYTE [DISTWIDTH] IN BLOOD BY AUTOMATED COUNT: 12.1 % (ref 10–15)
GLUCOSE SERPL-MCNC: 104 MG/DL (ref 70–99)
HCO3 SERPL-SCNC: 25 MMOL/L (ref 22–29)
HCT VFR BLD AUTO: 43.4 % (ref 40–53)
HGB BLD-MCNC: 15.9 G/DL (ref 13.3–17.7)
HOLD SPECIMEN: NORMAL
IMM GRANULOCYTES # BLD: 0.1 10E3/UL
IMM GRANULOCYTES NFR BLD: 1 %
LYMPHOCYTES # BLD AUTO: 1 10E3/UL (ref 0.8–5.3)
LYMPHOCYTES NFR BLD AUTO: 8 %
MCH RBC QN AUTO: 33 PG (ref 26.5–33)
MCHC RBC AUTO-ENTMCNC: 36.6 G/DL (ref 31.5–36.5)
MCV RBC AUTO: 90 FL (ref 78–100)
MONOCYTES # BLD AUTO: 0.9 10E3/UL (ref 0–1.3)
MONOCYTES NFR BLD AUTO: 7 %
NEUTROPHILS # BLD AUTO: 10.9 10E3/UL (ref 1.6–8.3)
NEUTROPHILS NFR BLD AUTO: 84 %
NRBC # BLD AUTO: 0 10E3/UL
NRBC BLD AUTO-RTO: 0 /100
PLATELET # BLD AUTO: 241 10E3/UL (ref 150–450)
POTASSIUM SERPL-SCNC: 3.3 MMOL/L (ref 3.4–5.3)
PROT SERPL-MCNC: 7 G/DL (ref 6.4–8.3)
RBC # BLD AUTO: 4.82 10E6/UL (ref 4.4–5.9)
SODIUM SERPL-SCNC: 138 MMOL/L (ref 135–145)
SPECIMEN EXPIRATION DATE: NORMAL
WBC # BLD AUTO: 13 10E3/UL (ref 4–11)

## 2024-07-25 PROCEDURE — 99285 EMERGENCY DEPT VISIT HI MDM: CPT | Performed by: EMERGENCY MEDICINE

## 2024-07-25 PROCEDURE — 36415 COLL VENOUS BLD VENIPUNCTURE: CPT | Performed by: EMERGENCY MEDICINE

## 2024-07-25 PROCEDURE — 70486 CT MAXILLOFACIAL W/O DYE: CPT

## 2024-07-25 PROCEDURE — 85025 COMPLETE CBC W/AUTO DIFF WBC: CPT | Performed by: EMERGENCY MEDICINE

## 2024-07-25 PROCEDURE — 250N000011 HC RX IP 250 OP 636: Performed by: EMERGENCY MEDICINE

## 2024-07-25 PROCEDURE — 70450 CT HEAD/BRAIN W/O DYE: CPT

## 2024-07-25 PROCEDURE — 73562 X-RAY EXAM OF KNEE 3: CPT | Mod: LT

## 2024-07-25 PROCEDURE — 73090 X-RAY EXAM OF FOREARM: CPT | Mod: RT

## 2024-07-25 PROCEDURE — 96361 HYDRATE IV INFUSION ADD-ON: CPT | Performed by: EMERGENCY MEDICINE

## 2024-07-25 PROCEDURE — 96375 TX/PRO/DX INJ NEW DRUG ADDON: CPT | Performed by: EMERGENCY MEDICINE

## 2024-07-25 PROCEDURE — 86901 BLOOD TYPING SEROLOGIC RH(D): CPT | Performed by: EMERGENCY MEDICINE

## 2024-07-25 PROCEDURE — 72125 CT NECK SPINE W/O DYE: CPT

## 2024-07-25 PROCEDURE — 74177 CT ABD & PELVIS W/CONTRAST: CPT

## 2024-07-25 PROCEDURE — 250N000013 HC RX MED GY IP 250 OP 250 PS 637: Performed by: EMERGENCY MEDICINE

## 2024-07-25 PROCEDURE — 258N000003 HC RX IP 258 OP 636: Performed by: EMERGENCY MEDICINE

## 2024-07-25 PROCEDURE — 96374 THER/PROPH/DIAG INJ IV PUSH: CPT | Mod: 59 | Performed by: EMERGENCY MEDICINE

## 2024-07-25 PROCEDURE — 71046 X-RAY EXAM CHEST 2 VIEWS: CPT

## 2024-07-25 PROCEDURE — 86900 BLOOD TYPING SEROLOGIC ABO: CPT | Performed by: EMERGENCY MEDICINE

## 2024-07-25 PROCEDURE — 99285 EMERGENCY DEPT VISIT HI MDM: CPT | Mod: 25 | Performed by: EMERGENCY MEDICINE

## 2024-07-25 PROCEDURE — 82040 ASSAY OF SERUM ALBUMIN: CPT | Performed by: EMERGENCY MEDICINE

## 2024-07-25 PROCEDURE — 250N000009 HC RX 250: Performed by: EMERGENCY MEDICINE

## 2024-07-25 PROCEDURE — 73552 X-RAY EXAM OF FEMUR 2/>: CPT | Mod: LT

## 2024-07-25 PROCEDURE — 73070 X-RAY EXAM OF ELBOW: CPT | Mod: RT

## 2024-07-25 RX ORDER — KETOROLAC TROMETHAMINE 15 MG/ML
15 INJECTION, SOLUTION INTRAMUSCULAR; INTRAVENOUS ONCE
Status: COMPLETED | OUTPATIENT
Start: 2024-07-25 | End: 2024-07-25

## 2024-07-25 RX ORDER — ACETAMINOPHEN 500 MG
1000 TABLET ORAL ONCE
Status: COMPLETED | OUTPATIENT
Start: 2024-07-25 | End: 2024-07-25

## 2024-07-25 RX ORDER — IOPAMIDOL 755 MG/ML
100 INJECTION, SOLUTION INTRAVASCULAR ONCE
Status: COMPLETED | OUTPATIENT
Start: 2024-07-25 | End: 2024-07-25

## 2024-07-25 RX ORDER — HYDROMORPHONE HYDROCHLORIDE 1 MG/ML
0.5 INJECTION, SOLUTION INTRAMUSCULAR; INTRAVENOUS; SUBCUTANEOUS ONCE
Status: COMPLETED | OUTPATIENT
Start: 2024-07-25 | End: 2024-07-25

## 2024-07-25 RX ORDER — POTASSIUM CHLORIDE 750 MG/1
20 TABLET, EXTENDED RELEASE ORAL ONCE
Status: COMPLETED | OUTPATIENT
Start: 2024-07-25 | End: 2024-07-25

## 2024-07-25 RX ADMIN — SODIUM CHLORIDE 38 ML: 9 INJECTION, SOLUTION INTRAVENOUS at 12:05

## 2024-07-25 RX ADMIN — POTASSIUM CHLORIDE 20 MEQ: 750 TABLET, EXTENDED RELEASE ORAL at 13:36

## 2024-07-25 RX ADMIN — KETOROLAC TROMETHAMINE 15 MG: 15 INJECTION, SOLUTION INTRAMUSCULAR; INTRAVENOUS at 13:04

## 2024-07-25 RX ADMIN — ACETAMINOPHEN 1000 MG: 500 TABLET ORAL at 13:04

## 2024-07-25 RX ADMIN — IOPAMIDOL 76 ML: 755 INJECTION, SOLUTION INTRAVENOUS at 12:05

## 2024-07-25 RX ADMIN — HYDROMORPHONE HYDROCHLORIDE 0.5 MG: 1 INJECTION, SOLUTION INTRAMUSCULAR; INTRAVENOUS; SUBCUTANEOUS at 09:27

## 2024-07-25 RX ADMIN — SODIUM CHLORIDE 1000 ML: 9 INJECTION, SOLUTION INTRAVENOUS at 09:23

## 2024-07-25 ASSESSMENT — ACTIVITIES OF DAILY LIVING (ADL)
ADLS_ACUITY_SCORE: 35

## 2024-07-25 ASSESSMENT — COLUMBIA-SUICIDE SEVERITY RATING SCALE - C-SSRS
2. HAVE YOU ACTUALLY HAD ANY THOUGHTS OF KILLING YOURSELF IN THE PAST MONTH?: NO
6. HAVE YOU EVER DONE ANYTHING, STARTED TO DO ANYTHING, OR PREPARED TO DO ANYTHING TO END YOUR LIFE?: NO
1. IN THE PAST MONTH, HAVE YOU WISHED YOU WERE DEAD OR WISHED YOU COULD GO TO SLEEP AND NOT WAKE UP?: NO

## 2024-07-25 NOTE — DISCHARGE INSTRUCTIONS
As we discussed your CT scans and x-rays did not show any signs of broken bones or internal bleeding.  There was an incidental finding on your CT of the abdomen showing signs of intussusception.  An incidental finding is something that is not related to why you are here today but is just something that we found on our tests.  You should follow-up with your primary care doctor regarding this.  If you develop any abdominal pain, nausea, vomiting or other concerns you should be reevaluated in the emergency department to see if this could be the cause.  This type of intussusception can often be temporary and go away on its own.
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
## 2024-07-25 NOTE — Clinical Note
Robin Cheney was seen and treated in our emergency department on 7/25/2024.  He may return to work on 07/29/2024.       If you have any questions or concerns, please don't hesitate to call.      Freddy Senior MD